# Patient Record
Sex: FEMALE | Race: OTHER
[De-identification: names, ages, dates, MRNs, and addresses within clinical notes are randomized per-mention and may not be internally consistent; named-entity substitution may affect disease eponyms.]

---

## 2017-01-20 ENCOUNTER — HOSPITAL ENCOUNTER (EMERGENCY)
Dept: HOSPITAL 84 - D.ER | Age: 52
Discharge: HOME | End: 2017-01-20
Payer: SELF-PAY

## 2017-01-20 VITALS — BODY MASS INDEX: 44.8 KG/M2

## 2017-01-20 DIAGNOSIS — J45.909: ICD-10-CM

## 2017-01-20 DIAGNOSIS — R07.9: Primary | ICD-10-CM

## 2017-01-20 DIAGNOSIS — E87.6: ICD-10-CM

## 2017-01-20 DIAGNOSIS — R13.10: ICD-10-CM

## 2017-01-20 LAB
ALBUMIN SERPL-MCNC: 3.1 G/DL (ref 3.4–5)
ALP SERPL-CCNC: 81 U/L (ref 46–116)
ALT SERPL-CCNC: 18 U/L (ref 10–68)
ANION GAP SERPL CALC-SCNC: 11.7 MMOL/L (ref 8–16)
BASOPHILS NFR BLD AUTO: 0.2 % (ref 0–2)
BILIRUB SERPL-MCNC: 0.51 MG/DL (ref 0.2–1.3)
BUN SERPL-MCNC: 13 MG/DL (ref 7–18)
CALCIUM SERPL-MCNC: 8.6 MG/DL (ref 8.5–10.1)
CHLORIDE SERPL-SCNC: 106 MMOL/L (ref 98–107)
CHOLEST/HDLC SERPL: 2.5 RATIO (ref 2.3–4.1)
CK MB SERPL-MCNC: 1.2 U/L (ref 0–3.6)
CK SERPL-CCNC: 162 UL (ref 21–215)
CO2 SERPL-SCNC: 26.8 MMOL/L (ref 21–32)
CREAT SERPL-MCNC: 0.7 MG/DL (ref 0.6–1.3)
EOSINOPHIL NFR BLD: 2.4 % (ref 0–7)
ERYTHROCYTE [DISTWIDTH] IN BLOOD BY AUTOMATED COUNT: 12.6 % (ref 11.5–14.5)
GLOBULIN SER-MCNC: 3.6 G/L
GLUCOSE SERPL-MCNC: 102 MG/DL (ref 74–106)
HCT VFR BLD CALC: 36.4 % (ref 36–48)
HDLC SERPL-MCNC: 41 MG/DL (ref 32–96)
HGB BLD-MCNC: 12.4 G/DL (ref 12–16)
IMM GRANULOCYTES NFR BLD: 0.2 % (ref 0–5)
LDL-HDL RATIO: 1.1 RATIO (ref 1.5–3.5)
LDLC SERPL-MCNC: 44 MG/DL (ref 0–100)
LYMPHOCYTES NFR BLD AUTO: 40.6 % (ref 15–50)
MCH RBC QN AUTO: 30 PG (ref 26–34)
MCHC RBC AUTO-ENTMCNC: 34.1 G/DL (ref 31–37)
MCV RBC: 88.1 FL (ref 80–100)
MONOCYTES NFR BLD: 5.1 % (ref 2–11)
NEUTROPHILS NFR BLD AUTO: 51.5 % (ref 40–80)
OSMOLALITY SERPL CALC.SUM OF ELEC: 282 MOSM/KG (ref 275–300)
PLATELET # BLD: 246 10X3/UL (ref 130–400)
PMV BLD AUTO: 10 FL (ref 7.4–10.4)
POTASSIUM SERPL-SCNC: 2.5 MMOL/L (ref 3.5–5.1)
PROT SERPL-MCNC: 6.7 G/DL (ref 6.4–8.2)
RBC # BLD AUTO: 4.13 10X6/UL (ref 4–5.4)
SODIUM SERPL-SCNC: 142 MMOL/L (ref 136–145)
TRIGL SERPL-MCNC: 85 MG/DL (ref 30–200)
TROPONIN I SERPL-MCNC: < 0.017 NG/ML (ref 0–0.06)
WBC # BLD AUTO: 6.3 10X3/UL (ref 4.8–10.8)

## 2017-01-24 ENCOUNTER — HOSPITAL ENCOUNTER (OUTPATIENT)
Dept: HOSPITAL 84 - D.ER | Age: 52
Discharge: HOME | End: 2017-01-24
Attending: INTERNAL MEDICINE
Payer: SELF-PAY

## 2017-01-24 VITALS — BODY MASS INDEX: 44.8 KG/M2

## 2017-01-24 DIAGNOSIS — R07.89: Primary | ICD-10-CM

## 2017-01-24 LAB
ALBUMIN SERPL-MCNC: 3.1 G/DL (ref 3.4–5)
ALP SERPL-CCNC: 65 U/L (ref 46–116)
ALT SERPL-CCNC: 17 U/L (ref 10–68)
ANION GAP SERPL CALC-SCNC: 8.8 MMOL/L (ref 8–16)
BASOPHILS NFR BLD AUTO: 0.1 % (ref 0–2)
BILIRUB SERPL-MCNC: 0.7 MG/DL (ref 0.2–1.3)
BUN SERPL-MCNC: 10 MG/DL (ref 7–18)
CALCIUM SERPL-MCNC: 8.6 MG/DL (ref 8.5–10.1)
CHLORIDE SERPL-SCNC: 103 MMOL/L (ref 98–107)
CK MB SERPL-MCNC: 0.5 U/L (ref 0–3.6)
CK SERPL-CCNC: 121 UL (ref 21–215)
CO2 SERPL-SCNC: 29 MMOL/L (ref 21–32)
CREAT SERPL-MCNC: 0.7 MG/DL (ref 0.6–1.3)
EOSINOPHIL NFR BLD: 1.2 % (ref 0–7)
ERYTHROCYTE [DISTWIDTH] IN BLOOD BY AUTOMATED COUNT: 13.1 % (ref 11.5–14.5)
GLOBULIN SER-MCNC: 3.9 G/L
GLUCOSE SERPL-MCNC: 101 MG/DL (ref 74–106)
HCT VFR BLD CALC: 38.2 % (ref 36–48)
HGB BLD-MCNC: 12.8 G/DL (ref 12–16)
IMM GRANULOCYTES NFR BLD: 0.1 % (ref 0–5)
LYMPHOCYTES NFR BLD AUTO: 13.9 % (ref 15–50)
MCH RBC QN AUTO: 30.1 PG (ref 26–34)
MCHC RBC AUTO-ENTMCNC: 33.5 G/DL (ref 31–37)
MCV RBC: 89.9 FL (ref 80–100)
MONOCYTES NFR BLD: 5.5 % (ref 2–11)
NEUTROPHILS NFR BLD AUTO: 79.2 % (ref 40–80)
OSMOLALITY SERPL CALC.SUM OF ELEC: 274 MOSM/KG (ref 275–300)
PLATELET # BLD: 207 10X3/UL (ref 130–400)
PMV BLD AUTO: 10.2 FL (ref 7.4–10.4)
POTASSIUM SERPL-SCNC: 2.8 MMOL/L (ref 3.5–5.1)
PROT SERPL-MCNC: 7 G/DL (ref 6.4–8.2)
RBC # BLD AUTO: 4.25 10X6/UL (ref 4–5.4)
SODIUM SERPL-SCNC: 138 MMOL/L (ref 136–145)
TROPONIN I SERPL-MCNC: < 0.017 NG/ML (ref 0–0.06)
WBC # BLD AUTO: 8.8 10X3/UL (ref 4.8–10.8)

## 2017-01-24 NOTE — HEMODYNAMI
PATIENT:ADITYA YIN                               MEDICAL RECORD: [id number]
: [birth year]                                            [id number]                                       ADMISSION DATE: 17
 
 
 Generatedon:201711:22
Patient name: ADITYA YIN Patient #: [id number] Visit #: [id number] SSN: 
: 1965
Date of study: 2017
Page: Of
Hemodynamic Procedure Report
****************************
Patient Data
Patient Demographics
Procedure consent was obtained
First Name: ADITYA        Gender: Female
Last Name: HUMPHREY          : 1965
Middle Initial: M           Age: 51 year(s)
Patient #: [id number]       Race: Other
Visit #: [id number]
Accession #:
[id number]
Additional ID: [id number]
Contact details
Address: [address] [place] [address]   Phone: [phone]
[address]
State: [place]
City: [facility]
Zip code: [address]
Past Medical History
Allergies: No known allergies
Admission
Admission Data
Admission Date: 2017   Admission Time: 7:55
Weight (lbs.): 172
Weight (kg.): 78.02
Lab Results
Lab Result Date: 2017  Lab Result Time: 0:00
Biochemistry
Name         Units    Result                Min      Max
BUN          mg/dl    10       --(-*--)--   7        18
Creatinine   mg/dl    0.7      --(*---)--   0.6      1.3
Potassium    mmol/l   2.8      *-(----)--   3.5      5.1
CBC
Name         Units    Result                Min      Max
Hemoglobin   g/dl     12.8     -*(----)--   13.5     17.5
Procedure
Procedure Types
Cath Procedure
Diagnostic Procedure
LHC
LHC w/Coronaries
Procedure Description
Procedure Date
Procedure Date: 2017
Procedure Start Time: 11:14
Procedure End Time: 11:18
Procedure Staff
Name                            Function
 
Vivek Reynolds MD                Performing Physician
Kim Crawford RT                    Scrub
Heather Mahoney RN               Nurse
Sebastien Ge RT                  Monitor
Indication
Angina
Procedure Data
Cath Procedure
Fluoroscopy
Diagnostic fluoroscopy      Total fluoroscopy Time: 0.7
time: 0.7 min               min
Diagnostic fluoroscopy      Total fluoroscopy dose: 159
dose: 159 mGy               mGy
Contrast Material
Contrast Material Type                       Amount (ml)
Isovue 300                                   40
Entry Location
Entry     Primary  Successful  Side  Size  Upsize Upsize Entry    Closure Succes
sful  Closure
Location                             (Fr)  1 (Fr) 2 (Fr) Remarks  Device        
      Remarks
Femoral                        Right 5 Fr                         Vascade
artery                                                            Closure
System
Diagnostic catheters
Device Type               Used For           End Catheter
Placement
Cordis 5Fr Pigtail        LV Angiography
Catheter (MP)
Cordis 5Fr JL 4.0         Left Coronary
Catheter (MP)             Angiography
Cordis 5Fr 3DRC Catheter  Right Coronary
(MP)                      Angiography
Procedure Complications
No complications
Procedure Medications
Medication           Administration Route Dosage
Oxygen               NC                   2 l/min
Heparin Flush Bag    added to field       2 bags
(1000units/500ml NS)
Lidocaine 2%         added to field       20
Versed               I.V.                 1 mg
Fentanyl             I.V.                 50 mcg
Fentanyl             I.V.                 50 mcg
Versed               I.V.                 1 mg
Fentanyl             I.V.                 25 mcg
Versed               I.V.                 0.5 mg
Hemodynamics
Rest
HGB: 12.8 (g/dl) Heart Rate: 67 (bpm)
Snapshots
Pre Cath      Intra         NCS           Post Cath
Vital Signs
Time     Heart  Resp   SPO2 NIBP       Rhythm  Pain    Sedation
Rate   (ipm)  (%)  (mmHg)             Status  Level
(bpm)
10:54:56 69     17     99   115/75(98) NSR     0 (11)  10(A)
, No
pain
10:59:10 71     15     100  114/78(91) NSR     0 (11)  10(A)
 
, No
pain
11:03:24 79     14     99   107/62(82) NSR     0 (11)  10(A)
, No
pain
11:07:36 86     15     95   107/68(87) NSR     0 (11)  10(A)
, No
pain
11:11:45 73     14     96   108/72(83) NSR     0 (11)  10(A)
, No
pain
11:15:53 91     14     95   97/68(79)  NSR     0 (11)  9(A)
, No
pain
11:20:50 87     15     96   102/70(84) NSR     0 (11)  9(A)
, No
pain
Medications
Time     Medication       Route  Dose  Verified Delivered Reason    Notes  Effec
tiveness
by       by
10:54:28 Oxygen           NC     2     Vivek Sorensonecca   Per
l/min Rodolfo Mahoney RN physician
10:54:35 Heparin Flush    added  2     Vivek Doyle   used for
Bag              to     bags  Rodolfo Reynolds MD  procedure
(1000units/500ml field
NS)
10:54:42 Lidocaine 2%     added  20ml  Vivek Doyle   used for
to     vial  Rodolfo Reynolds MD  procedure
field
11:12:41 Fentanyl         I.V.   50    Vivek  Heather   for
mcg   Rodolfo Mahoney RN sedation
11:12:51 Versed           I.V.   1 mg  Vivek  Heather   for
Rodolfo Mahoney RN sedation
11:14:44 Fentanyl         I.V.   50    Vivek  Heather   for
mcg   Rodolfo Mahoney RN sedation
11:14:46 Versed           I.V.   1 mg  Vivek  Heather   for
Rodolfo Mahoney RN sedation
11:15:42 Fentanyl         I.V.   25    Vivek  Heather   for
mcg   Rodolfo Mahoney RN sedation
11:15:51 Versed           I.V.   0.5   Vivek  Heather   for
mg    Rodolfo Mahoney RN sedation
Procedure Log
Time     Note
10:30:53 Heather Mahoney RN sent for patient. Start room use.
10:42:31 Diagnostic Cath Status : Elective
10:42:50 Indication : Angina
10:43:00 Time tracking: Regular hours
10:43:04 Plan of Care:Hemodynamics will remain stable., Cardiac rhythm will
remain stable., Comfort level will be maintained., Respiratory function
will remain adequate., Patient/ family verbilizes understanding of
procedure., Procedure tolerated without complication., Recovers from
procedure without complications..
10:47:27 Patient received from ED to CCL 1 Alert and oriented. Tansferred to
table in Supine position.
10:47:28 Warm blankets applied, and scott hugger turned on for patient comfort.
10:47:29 Correct patient and procedure confirmed by team.
10:47:30 Signed procedure consent form obtained from patient.
10:47:31 ECG and BP/O2 sat monitors applied to patient.
10:53:47 Baseline sample Acquired.
 
10:53:47 Vital chart was started
10:54:11 Rhythm: sinus rhythm
10:54:12 Full Disclosure recording started
10:54:18 H&P Date Dictated: 2017 Within 30 days and on chart..
10:54:18 Pre-procedure instructions explained to patient.
10:54:19 Pre-op teaching completed and patient verbalized understanding.
10:54:21 Family unavailable.
10:54:23 Patient NPO since Midnight.
10:54:28 Oxygen 2 l/min NC was given by Heather Mahoney RN; Per physician;
10:54:35 Heparin Flush Bag (1000units/500ml NS) 2 bags added to field was given
by Vivek Reynolds MD; used for procedure;
10:54:42 Lidocaine 2% 20ml vial added to field was given by Vivek eRynolds MD;
used for procedure;
10:54:44 family went home to get his meds.
10:55:18 Patient allergic to No known allergies
10:55:20 Is the patient allergic to Iodine/contrast media? No.
10:55:22 Is patient on blood thinner?No
10:55:26 Patient diabetic? No.
10:55:41 ----Pre-sedation anethsthesia assessment.----
10:55:43 Previous problem with sedation/anesthesia? No ?
10:55:44 Snore? Yes
10:55:46 Sleep apnea? No
10:55:47 Deviated septum? No
10:55:48 Opens mouth fully? Yes
10:55:50 Sticks out tongue? Yes
10:55:51 Airway obstruction? No ?
10:55:53 Dentures? No ?
10:55:55 Pre procedure: right dorsailis pedis pulse 1+ Palpable, but thready &
weak; easily obliterated
10:55:59 Modified Zaid's test Ulnar > 7 seconds.
10:56:01 Patient pain scale 0/10 ?.
10:56:14 IV patent on arrival in left antecubital with 0.9% NaCl at 10ml/hr.
10:58:28 HCG/Urine Pregnancy: not drawn
10:58:30 Patient not pregnant. Patient has had tubal.
10:59:09 Lab Result : BUN 10 mg/dl
10:59:09 Lab Result : Potassium 2.8 mmol/l
10:59:09 Lab Result : Creatinine 0.7 mg/dl
10:59:09 Lab Result : Hemoglobin 12.8 g/dl
10:59:13 Lab results completed and on chart.
10:59:15 Right groin area was prepped with chlora-prep and draped in sterile
fashion
10:59:16 Alarms reviewed by KINDRA GAMBOA
10:59:16 -----------------------------------------------------------------------
-
10:59:17 Sharps counted by scrub and verified by RDarleneNDarlene
10:59:22 Use device set Femoral Dx
10:59:22 Acist Syringe opened to sterile field.
10:59:23 Bag Decanter opened to sterile field.
10:59:23 Cardinal Cath Pack opened to sterile field.
10:59:23 Terumo 5Fr [place] Sheath opened to sterile field.
10:59:24 St Samy 260cm J .035 wire opened to sterile field.
10:59:25 Acist Hand Control opened to sterile field.
10:59:25 Acist Manifold opened to sterile field.
10:59:26 Cordis Infinity 5Fr Multipack catheter opened to sterile field.
10:59:27 Tegaderm 4 x 4 opened to sterile field.
11:00:14 Patient Weight : 78.02 lbs
11:05:21 Zero performed for pressure channel P1
11:06:12 Zero performed for pressure channel P1
11:06:17 Zero performed for pressure channel P1
11:06:28 Zero performed for pressure channel P1
 
11:06:32 Zero performed for pressure channel P1
11:06:36 Zero performed for pressure channel P1
11:12:35 --------ALL STOP TIME OUT------
11:12:36 Final Timeout: patient, procedure, and site verified with staff and
physician. All members of the team are in agreement.
11:12:37 Right groin site verified by team.
11:12:40 Physical assessment completed. ASA score P 2 - A patient with mild
systemic disease as per Vivek Reynolds MD.
11:12:41 Fentanyl 50 mcg I.V. was given by Heather Mahoney RN; for sedation;
11:12:44 Sedation plan: IV Moderate Sedation Versed, Fentanyl
11:12:51 Versed 1 mg I.V. was given by Heather Mahoney RN; for sedation;
11:13:52 Procedure started.
11:14:04 Local anesthetic to right femoral artery with Lidocaine 2% by Vivek Reynolds MD.**INITIAL ACCESS ONLY**
11:14:11 A 5 Fr sheath was inserted into the Right Femoral artery
11:14:44 Fentanyl 50 mcg I.V. was given by Heather Mahoney RN; for sedation;
11:14:46 Versed 1 mg I.V. was given by Heather Mahoney RN; for sedation;
11:15:13 A Cordis 5Fr Pigtail Catheter (MP) was advanced over the wire and used
for LV Angiography.
11:15:16 LV gram done using LEE
11:15:22 EF : 50 %
11:15:23 Catheter removed.
11:15:28 A Cordis 5Fr JL 4.0 Catheter (MP) was advanced over the wire and used
for Left Coronary Angiography.
11:15:32 LCA angiography performed.
11:15:42 Fentanyl 25 mcg I.V. was given by Heather Mahoney RN; for sedation;
11:15:51 Versed 0.5 mg I.V. was given by Heather Mahoney RN; for sedation;
11:16:25 Catheter removed.
11:16:32 A Cordis 5Fr 3DRC Catheter (MP) was advanced over the wire and used for
Right Coronary Angiography.
11:17:22 RCA angiography performed.
11:17:23 Catheter removed.
11:17:28 Vascade 5Fr Closure Device opened to sterile field.
11:17:40 Sheath removed intact; hemostasis achieved with Vascade Closure System
to the Right Femoral artery.
11:17:42 Procedure ended.(Physican Out)
11:17:45 Fluoroscopy time 00.70 minutes.
11:17:49 Flurop Dose total: 159
11:17:49 Fluoroscopy dose: 159 mGy
11:17:58 Contrast amount:Isovue 300 40ml.
11:17:59 Sharps counted by scrub and verified by R.N.
11:18:01 Insertion/operative site no bleeding no hematoma.
11:18:04 Post-op/insertion site Right Femoral artery dressed using a 4 x 4 and
Tegaderm.
11:18:06 Post right femoral artery:stable
11:18:07 Post Procedure Pulses reassessed and unchanged
11:18:09 Post procedure: right dorsailis pedis pulse 1+ Palpable, but thready &
weak; easily obliterated.
11:18:13 Post procedure rhythm: sinus rhythm
11:18:14 Post procedure instruction explained to patient.Patient verbalizes
understanding.
11:18:25 Procedure and supply charges have been captured, reviewed, submitted an
d
are correct.
11:18:29 Procedure Complication : No complications
11:18:39 Vital chart was stopped
11:18:39 See physician's report for complete and final results.
11:18:41 Report given to Post Procedure Room.
11:18:45 Patient transfered to Post Procedure Room with Stretcher.
11:18:51 Procedure ended.
 
11:18:51 Full Disclosure recording stopped
11:18:56 End room use (Document Last)
Device Usage
Item Name Manufacture  Quantity  Catalog Number Hospital Part    Current Minimal
 Lot# /
Charge   Number  Stock   Stock   Serial#
Code
Acist     Acist        1         [id number]          535151   050189  057288  20
Syringe   Medical
Systems Inc
Bag       Microtek     1         2002S          966594   19192   185929  5
Decanter  Medical Inc.
Cardinal  Cardinal     1         [id number]     633502   70645   126523  5
Cath Pack Health
Terumo    Terumo       1         [id number]         578363   207688  189961  40
5Fr
[place]
Sheath
St Samy   St Samy      1         936563         232883   957765  348760  30
260cm J
.035 wire
Acist     Acist        1         94421          732956   804841  919359  5
Hand      Medical
Control   Systems Inc
Acist     Acist        1         56287          896283   999814  446854  5
Manifold  Medical
Systems Inc
Cordis    Cardinal     1         [id number]         748917   26785   212846  30
Infinity  Health
5Fr
Multipack
catheter
Tegaderm  3M           1         1626W          536641   615803  024372  5
4 x 4
Cordis    Cardinal     1                                         300988  5
5Fr       Health
Pigtail
Catheter
(MP)
Cordis    Cardinal     1                                         300777  5
5Fr JL    Health
4.0
Catheter
(MP)
Cordis    Cardinal     1                                         268475  5
5Fr [facility]  Health
Catheter
(MP)
Vascade   Cardiva      1         [id number]  816404   89763   905356  10
5Fr       Medical,
Closure   Inc.
Device
Signature Audit [place]
Stage           Time        Signature      Unsigned
Intra-Procedure 2017   Sebastien Ge
11:22:11 AM RT(R)
Signatures
Monitor : Sebastien Ge RT Signature :
______________________________
 
Date : ______________ Time :
______________
 
 
 
 
 
 
 
 
 
 
 
 
 
 
 
 
 
 
 
 
 
 
 
 
 
 
 
 
 
 
 
 
 
 
 
 
 
 
 
 
 
 
 
 
 
 
 
 
 
 
 
 
 
[place] [address] [place] [address]

## 2017-01-24 NOTE — NUR
RESTING IN BED, RESPONDS TO VERBAL STIMULI. VSS. RIGHT GROIN CDI, NO
HEMATOMA NOTED. ON 2L NC, NO DISTRESS NOTED. WILL CONTINUE TO
MONITOR.

## 2017-01-24 NOTE — NUR
HOB ELEVATED 30 DEGREES, AWAKE SPEAKING WITH FAMILY AT BEDSIDE. VSS,
2L NC, NO DISTRESS NOTED. RIGHT GROIN DRSG CDI, NO BLEEDING OR
HEMATOMA NOTED.

## 2017-01-30 NOTE — OP
PATIENT NAME:  ADITYA YIN                        MEDICAL RECORD: [id number]
:[birth year]                                             LOCATION:D.OPS          
                                                         ADMISSION DATE:        
SURGEON:  CHETAN ROBISON MD             
 
 
DATE OF OPERATION:  2017
 
PROCEDURES:
1.  Left heart catheterization.
2.  Selective coronary angiography.
3.  Left ventriculogram.
 
INDICATION:  Chest pain compatible with angina.
 
PROCEDURE IN DETAIL:  After informed consent was obtained and after detailed
explanation of risks, benefits, as well as alternative therapies, the patient
elected to proceed with angiogram and heart catheterization.  The right femoral
area was prepped and draped in normal sterile fashion.  The right femoral artery
was cannulated via modified Seldinger technique with placement of 5-[place]
sheath.  All catheters exchanged through this sheath.
 
FINDINGS:  The left ventriculogram was performed in standard 30-degree LEE view,
reveals good cardiac wall motion throughout all segments.  Overall ejection
fraction estimated at 60%.
 
SELECTIVE CORONARY ANGIOGRAPHY:  Left main, left anterior descending, left
circumflex, and right coronary artery are all smooth-walled vessels with no
angiographic evidence of coronary artery disease.
 
OVERALL IMPRESSION:
1.  No angiographic evidence of coronary artery disease.
2.  Normal left heart pressures.
3.  Normal left ventricular systolic function.  Chest pain is noncardiac in
etiology.
 
TRANSINT:[id number] Voice Confirmation ID: [id number] DOCUMENT ID: [id number]
                                           
                                           CHETAN ROBISON MD             
 
 
 
Electronically Signed by CHETAN ROBISON on 17 at 1416
 
 
 
 
 
 
 
CC:                                                             [id number]
DICTATION DATE: 17 1120     :     17 1131      DEP CLI 
                                                                      17
[place] [id number]

## 2017-01-30 NOTE — CN
PATIENT NAME:ADITYA MASON                            MEDICAL RECORD: [id number]
: [birth year]                                              LOCATION:D.OPS      
ADMIT DATE:                                                ACCOUNT: [id number]
CONSULTING PHYSICIAN:    CHETAN ROBISON MD             
                                               
REFERRING PHYSICIAN:     CHETAN ROBISON MD             
 
 
DATE OF CONSULTATION:  2017
 
Cardiology Consultation
 
DIAGNOSES:
1.  Chest pain compatible with angina.
2.  Family history of coronary artery disease.
3.  Past smoking history.
 
HISTORY OF PRESENT ILLNESS:  Mrs. Mason presented Friday with chest pain
compatible with angina, she was sent out.  She has had recurrent chest pain over
the weekend, severe chest pain this morning.  Her chest pain is very compatible
with angina.  She has a pressure-like sensation across the anterior chest with
radiation to her jaw, today with the most dramatic and worst episode she had. 
She also had diaphoresis with this.
 
PHYSICAL EXAMINATION:
GENERAL APPEARANCE:  Well-nourished, well-developed, appears stated age.  Level
of distress, comfortable. 
PSYCHIATRIC:  Mental status, alert, normal affect.  Orientation, oriented to
time, place and person.  
EYES:  Lids and conjunctiva, noninjected.  No discharge, no pallor. 
ENT:  Lips, teeth, gums, normal dentition.  Oropharynx, no cyanosis, no pallor. 
NECK:  Carotid arteries, bilateral normal upstroke, no bruits, no thrills. 
JUGULAR VEINS:  No jugular venous pressure or distention. 
CERVICAL LYMPH NODES:  Nontender, nonenlarged.  
THYROID:  Not enlarged.  Nontender.  No nodules. 
LUNGS:  Respiratory effort, unlabored. 
CHEST:  Normal curvature.  No thoracic deformity.  No chest wall tenderness. 
Percussion, resonant.  Auscultation, clear.  No wheezes, no rales, no rhonchi. 
CARDIOVASCULAR:  Precordial exam, nondisplaced.  No heaves or pericardial
thrills.  Rate and rhythm, regular.  Heart sounds, normal S1, normal S2.  No S3,
no gallop, no rub.  Systolic murmur, not heard.  Diastolic murmur, not heard. 
EXTREMITIES:  No cyanosis, no edema.  Peripheral pulses, full and equal in all
extremities, except as noted.  No bruits appreciated. 
ABDOMEN:  Soft, nondistended.  Normal aorta.  No bruit.  Nontender.  No masses. 
Liver, nontender, no hepatomegaly.  Spleen, nontender, no splenomegaly.  
MUSCULOSKELETAL:  No joint tenderness.  No joint swelling.  No erythema.  
NEUROLOGICAL:  Normal gait, normal strength, normal tone.
SKIN:  Warm and dry.
 
REVIEW OF SYSTEMS:  The patient reports easy bruising but reports no swollen
glands. The patient reports no fever, no night sweats, no significant weight
gain, no significant weight loss.  No significant exercise tolerance.  The
patient reports no dry eyes, no irritation, no vision change.  Patient reports
no difficulty hearing and no ear pain.  Patient reports no frequent nose bleeds
or nose and sinus problems.  Patient reports on arm pain on exertion.   No
shortness of breath while lying down.  No history of heart murmur.  Patient
reports no cough, no wheezing or coughing up blood.  Patient reports no
abdominal pain, no vomiting.  Normal appetite.  No diarrhea and not vomiting
 
 
 
CONSULT REPORT                                 [id number]    ADITYA MASON          
 
 
blood.  No nausea and no constipation.  Patient reports no incontinence.  No
difficulty urinating.  No hematuria.  No increased frequency.  Patient reports
no muscle aches.  No weakness, no arthralgias, no back pain.  No swelling of the
extremities.  Patient reports no abnormal mole, no jaundice, no rashes.  Reports
no loss of consciousness.  No weakness and no numbness.  No seizures, dizziness,
or headaches.  The patient reports no depression, no sleep disturbance, feeling
safe in a relationship and no alcohol abuse.  Patient reports on fatigue. 
Reports no runny nose or sinus pressure.  No itching, no hives, and no frequent
sneezing.  
 
OVERALL IMPRESSION:  Recurrent chest pain compatible with angina.  We will
proceed with coronary angiography.  Further care depends upon findings of the
angiography.
 
TRANSINT:[id number] Voice Confirmation ID: [id number] DOCUMENT ID: [id number]
                                           
                                           CHETAN ROBISON MD             
 
 
 
Electronically Signed by CHETAN ROBISON on 17 at 1416
 
 
 
 
 
 
 
 
 
 
 
 
 
 
 
 
 
 
 
 
 
 
 
 
 
 
CC:                                                             [id number]
DICTATION DATE: 17 0952     :     17 1008      DEP CLI 
                                                                      17
[place] [id number] [place] [id number]

## 2017-10-13 ENCOUNTER — HOSPITAL ENCOUNTER (EMERGENCY)
Dept: HOSPITAL 84 - D.ER | Age: 52
Discharge: HOME | End: 2017-10-13
Payer: SELF-PAY

## 2017-10-13 VITALS — BODY MASS INDEX: 44.8 KG/M2

## 2017-10-13 DIAGNOSIS — R07.89: Primary | ICD-10-CM

## 2017-10-13 LAB
ALBUMIN SERPL-MCNC: 3.3 G/DL (ref 3.4–5)
ALP SERPL-CCNC: 111 U/L (ref 46–116)
ALT SERPL-CCNC: 17 U/L (ref 10–68)
ANION GAP SERPL CALC-SCNC: 9.9 MMOL/L (ref 8–16)
BASOPHILS NFR BLD AUTO: 0.3 % (ref 0–2)
BILIRUB SERPL-MCNC: 0.31 MG/DL (ref 0.2–1.3)
BUN SERPL-MCNC: 11 MG/DL (ref 7–18)
CALCIUM SERPL-MCNC: 8.7 MG/DL (ref 8.5–10.1)
CHLORIDE SERPL-SCNC: 107 MMOL/L (ref 98–107)
CHOLEST/HDLC SERPL: 2.2 RATIO (ref 2.3–4.1)
CK MB SERPL-MCNC: 0.4 U/L (ref 0–3.6)
CK SERPL-CCNC: 120 UL (ref 21–215)
CO2 SERPL-SCNC: 27.5 MMOL/L (ref 21–32)
CREAT SERPL-MCNC: 0.6 MG/DL (ref 0.6–1.3)
EOSINOPHIL NFR BLD: 3.5 % (ref 0–7)
ERYTHROCYTE [DISTWIDTH] IN BLOOD BY AUTOMATED COUNT: 12.7 % (ref 11.5–14.5)
GLOBULIN SER-MCNC: 3.7 G/L
GLUCOSE SERPL-MCNC: 92 MG/DL (ref 74–106)
HCT VFR BLD CALC: 38.8 % (ref 36–48)
HDLC SERPL-MCNC: 51 MG/DL (ref 32–96)
HGB BLD-MCNC: 13.3 G/DL (ref 12–16)
IMM GRANULOCYTES NFR BLD: 0 % (ref 0–5)
LDL-HDL RATIO: 0.7 RATIO (ref 1.5–3.5)
LDLC SERPL-MCNC: 36 MG/DL (ref 0–100)
LYMPHOCYTES NFR BLD AUTO: 40.3 % (ref 15–50)
MCH RBC QN AUTO: 30.6 PG (ref 26–34)
MCHC RBC AUTO-ENTMCNC: 34.3 G/DL (ref 31–37)
MCV RBC: 89.2 FL (ref 80–100)
MONOCYTES NFR BLD: 6.2 % (ref 2–11)
NEUTROPHILS NFR BLD AUTO: 49.7 % (ref 40–80)
NT-PROBNP SERPL-MCNC: 97 PG/ML (ref 0–125)
OSMOLALITY SERPL CALC.SUM OF ELEC: 279 MOSM/KG (ref 275–300)
PLATELET # BLD: 212 10X3/UL (ref 130–400)
PMV BLD AUTO: 10 FL (ref 7.4–10.4)
POTASSIUM SERPL-SCNC: 3.4 MMOL/L (ref 3.5–5.1)
PROT SERPL-MCNC: 7 G/DL (ref 6.4–8.2)
RBC # BLD AUTO: 4.35 10X6/UL (ref 4–5.4)
SODIUM SERPL-SCNC: 141 MMOL/L (ref 136–145)
TRIGL SERPL-MCNC: 131 MG/DL (ref 30–200)
TROPONIN I SERPL-MCNC: < 0.017 NG/ML (ref 0–0.06)
WBC # BLD AUTO: 6.8 10X3/UL (ref 4.8–10.8)

## 2018-01-04 ENCOUNTER — HOSPITAL ENCOUNTER (OUTPATIENT)
Dept: HOSPITAL 84 - D.ER | Age: 53
Setting detail: OBSERVATION
LOS: 1 days | Discharge: HOME | End: 2018-01-05
Attending: INTERNAL MEDICINE | Admitting: INTERNAL MEDICINE
Payer: MEDICAID

## 2018-01-04 VITALS
WEIGHT: 143.3 LBS | HEIGHT: 62 IN | BODY MASS INDEX: 26.37 KG/M2 | BODY MASS INDEX: 26.37 KG/M2 | WEIGHT: 143.3 LBS | HEIGHT: 62 IN | BODY MASS INDEX: 26.37 KG/M2

## 2018-01-04 DIAGNOSIS — R00.0: ICD-10-CM

## 2018-01-04 DIAGNOSIS — R53.1: ICD-10-CM

## 2018-01-04 DIAGNOSIS — R13.10: ICD-10-CM

## 2018-01-04 DIAGNOSIS — J11.1: Primary | ICD-10-CM

## 2018-01-04 DIAGNOSIS — I95.9: ICD-10-CM

## 2018-01-04 LAB
ALBUMIN SERPL-MCNC: 3.4 G/DL (ref 3.4–5)
ALP SERPL-CCNC: 66 U/L (ref 46–116)
ALT SERPL-CCNC: 15 U/L (ref 10–68)
ANION GAP SERPL CALC-SCNC: 13.9 MMOL/L (ref 8–16)
BASOPHILS NFR BLD AUTO: 0.1 % (ref 0–2)
BILIRUB SERPL-MCNC: 0.32 MG/DL (ref 0.2–1.3)
BUN SERPL-MCNC: 8 MG/DL (ref 7–18)
CALCIUM SERPL-MCNC: 8.7 MG/DL (ref 8.5–10.1)
CHLORIDE SERPL-SCNC: 103 MMOL/L (ref 98–107)
CO2 SERPL-SCNC: 26.7 MMOL/L (ref 21–32)
CREAT SERPL-MCNC: 0.7 MG/DL (ref 0.6–1.3)
EOSINOPHIL NFR BLD: 1 % (ref 0–7)
ERYTHROCYTE [DISTWIDTH] IN BLOOD BY AUTOMATED COUNT: 12.5 % (ref 11.5–14.5)
GLOBULIN SER-MCNC: 3.6 G/L
GLUCOSE SERPL-MCNC: 109 MG/DL (ref 74–106)
HCT VFR BLD CALC: 38.9 % (ref 36–48)
HGB BLD-MCNC: 13.2 G/DL (ref 12–16)
IMM GRANULOCYTES NFR BLD: 0 % (ref 0–5)
LYMPHOCYTES NFR BLD AUTO: 6.8 % (ref 15–50)
MCH RBC QN AUTO: 30.3 PG (ref 26–34)
MCHC RBC AUTO-ENTMCNC: 33.9 G/DL (ref 31–37)
MCV RBC: 89.2 FL (ref 80–100)
MONOCYTES NFR BLD: 5.1 % (ref 2–11)
NEUTROPHILS NFR BLD AUTO: 87 % (ref 40–80)
OSMOLALITY SERPL CALC.SUM OF ELEC: 277 MOSM/KG (ref 275–300)
PLATELET # BLD: 180 10X3/UL (ref 130–400)
PMV BLD AUTO: 9.8 FL (ref 7.4–10.4)
POTASSIUM SERPL-SCNC: 3.6 MMOL/L (ref 3.5–5.1)
PROT SERPL-MCNC: 7 G/DL (ref 6.4–8.2)
RBC # BLD AUTO: 4.36 10X6/UL (ref 4–5.4)
SODIUM SERPL-SCNC: 140 MMOL/L (ref 136–145)
WBC # BLD AUTO: 8 10X3/UL (ref 4.8–10.8)

## 2018-01-05 VITALS — DIASTOLIC BLOOD PRESSURE: 64 MMHG | SYSTOLIC BLOOD PRESSURE: 99 MMHG

## 2018-01-05 VITALS — DIASTOLIC BLOOD PRESSURE: 69 MMHG | SYSTOLIC BLOOD PRESSURE: 98 MMHG

## 2018-01-05 VITALS — DIASTOLIC BLOOD PRESSURE: 56 MMHG | SYSTOLIC BLOOD PRESSURE: 87 MMHG

## 2018-03-29 ENCOUNTER — HOSPITAL ENCOUNTER (EMERGENCY)
Dept: HOSPITAL 84 - D.ER | Age: 53
Discharge: HOME | End: 2018-03-29
Payer: MEDICAID

## 2018-03-29 VITALS — BODY MASS INDEX: 26.2 KG/M2
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
## 2018-03-29 DIAGNOSIS — M62.830: ICD-10-CM

## 2018-03-29 DIAGNOSIS — S39.012A: Primary | ICD-10-CM

## 2019-07-02 ENCOUNTER — HOSPITAL ENCOUNTER (OUTPATIENT)
Dept: HOSPITAL 84 - D.RAD | Age: 54
Discharge: HOME | End: 2019-07-02
Attending: INTERNAL MEDICINE
Payer: MEDICAID

## 2019-07-02 VITALS — BODY MASS INDEX: 26.2 KG/M2

## 2019-07-02 DIAGNOSIS — R11.2: ICD-10-CM

## 2019-07-02 DIAGNOSIS — R12: ICD-10-CM

## 2019-07-02 DIAGNOSIS — R13.10: Primary | ICD-10-CM
